# Patient Record
Sex: FEMALE | Race: WHITE | URBAN - METROPOLITAN AREA
[De-identification: names, ages, dates, MRNs, and addresses within clinical notes are randomized per-mention and may not be internally consistent; named-entity substitution may affect disease eponyms.]

---

## 2017-06-26 ENCOUNTER — TRANSFERRED RECORDS (OUTPATIENT)
Dept: HEALTH INFORMATION MANAGEMENT | Facility: CLINIC | Age: 67
End: 2017-06-26

## 2017-07-10 ENCOUNTER — TRANSFERRED RECORDS (OUTPATIENT)
Dept: HEALTH INFORMATION MANAGEMENT | Facility: CLINIC | Age: 67
End: 2017-07-10

## 2017-07-11 ENCOUNTER — MEDICAL CORRESPONDENCE (OUTPATIENT)
Dept: HEALTH INFORMATION MANAGEMENT | Facility: CLINIC | Age: 67
End: 2017-07-11

## 2017-07-11 ENCOUNTER — TRANSFERRED RECORDS (OUTPATIENT)
Dept: HEALTH INFORMATION MANAGEMENT | Facility: CLINIC | Age: 67
End: 2017-07-11

## 2017-07-12 ENCOUNTER — TRANSFERRED RECORDS (OUTPATIENT)
Dept: HEALTH INFORMATION MANAGEMENT | Facility: CLINIC | Age: 67
End: 2017-07-12

## 2017-07-13 ENCOUNTER — TRANSFERRED RECORDS (OUTPATIENT)
Dept: HEALTH INFORMATION MANAGEMENT | Facility: CLINIC | Age: 67
End: 2017-07-13

## 2017-08-04 ENCOUNTER — TRANSFERRED RECORDS (OUTPATIENT)
Dept: HEALTH INFORMATION MANAGEMENT | Facility: CLINIC | Age: 67
End: 2017-08-04

## 2017-08-08 ENCOUNTER — CARE COORDINATION (OUTPATIENT)
Dept: GASTROENTEROLOGY | Facility: CLINIC | Age: 67
End: 2017-08-08

## 2017-08-08 DIAGNOSIS — K86.2 PANCREATIC CYST: Primary | ICD-10-CM

## 2017-08-08 NOTE — PROGRESS NOTES
Advanced Endoscopy Clinic Intake:    Referring/Requesting provider and Health care System: Dr. Tika Barnes, [facility]     Clinic contact - Name, Phone and Fax number: Анна   Phone is [phone]   Fax is [phone]     Requested provider (if specified): Dr. Overton     Has patient been evaluated in clinic or had a procedure Advance Endoscopy provider in the last 5 years: No       Indication/Diagnosis for consultation: Pancreatic lesions noted on CT scan on 6/26/17     Is diagnosis on list of approved diagnosis: Yes   Has patient been evaluated by another Gastroenterologist? Yes, Dr. Barnes at [place]   Imaging completed:     CT scan     Yes, 6/26/17   MRI       No     Procedures:     Upper Endoscopy/EGD Yes, 7/11/17     Endoscopic Ultrasound/EUS            No     ERCP No     Colonoscopy Yes, 4/2/13     Are images able/being pushed to our system? Yes, per Анна from [facility]    Is patient aware of request for clinc consultation and ok to be contacted to schedule? Yes       Referral Received: 8/4/2017    Hard Copy chart created/ Records received: 8/8/2017 8/8/2017 0830: called Анна and asked for them to send the images for review.   8/8/2017 1201: no images in PACS.   8/9/2017: 1110: images in PACS    MD review date:       8/9/2017: routed to Dr. Overton                         Clinical History (per RN review of records provided):   Requesting EUS for evaluation of pancreatic lesion: possible IPMN.     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     7/11: she had EGD for abnormal CT findings.   CT: Multiple focal lesions of fluid attenuation are seen throughout the pancreas. The largest lesion involves the pancreatic neck and measures 1.9 cm x 1.7 in the greatest dimensions. Intraductal papillary mucinous neoplasm (IPMN) is suspected.     Recommendations/Orders:    Reviewed by Dr. Overton:  1. EUS for evaluation of pancreatic cyst in the OR with MAC.   2. Will  need Pre-op    Order placed and called left message for her to call back to go over the plan.  Sent to scheduling.   Hard copy chart sent to be scanned.            Hiral CARUSO RN Coordinator  Dr. Mccain, Dr. Overton & Dr. Raymond   Advanced Endoscopy  [phone]

## 2017-08-09 NOTE — PROGRESS NOTES
Case reviewed; direct to EUS for evaluation of pancreatic cyst; with me in the OR using MAC  No urgency as I dont think a mass was found; Ill review imaging prior  Dc Overton MD PhD  Director of Endoscopy   of Medicine, Surgery and Pediatrics  Interventional and Therapeutic Endoscopy    [facility]  Division of Gastroenterology and Hepatology  [facility] [address] [place], [place] [address]    New Consultations  [phone]  Procedure Scheduling [phone]  Clinical Nurse Coordinator [phone]  Clinical Fax   [phone]  Administrative   [phone]  Administrative Fax  [phone]

## 2017-08-10 ENCOUNTER — CARE COORDINATION (OUTPATIENT)
Dept: GASTROENTEROLOGY | Facility: CLINIC | Age: 67
End: 2017-08-10

## 2017-08-10 NOTE — PROGRESS NOTES
"Lary called back and she is amenable to plan for EUS. She is aware this is a non-urgent procedure. She will expect a call for date and time.   She is aware to she will need a , someone to stay with her for 24 hours and to stay in town for 24 hours post procedure, rational explained.   She is not on any blood thinner, no ASA and not diabetic. She will get her pre-op physical done locally and will fax a copy to us along with bringing a hard copy with her. Fax number given.   She is aware a pre-op nurse will call her 1-2 days prior to the procedure. She is advised to be NPO at midnight before the procedure in the event her procedure is moved up.     She is advised post procedure to start with clear liquids and advance diet slowly as tolerated. She is aware this RN will call her within 2-3 days post procedure to see how she is doing. In themean time post procedure she is   ~ Advised to go to the ER if develops:    Fevers over 101 +/- chills,    Significant nausea/vomiting causing inability to take in fluids depleting hydration.    severe pain, ongoing symptoms (pain, nausea) that cannot be controlled with prescribed or OTC medication.    Signs of dehydration (pale skin, low blood pressure, lightheadedness, dark urine, \"sticky\" skin when pinched, rapid heart rate, little to no urine output).    Verbalized understanding of all instructions. All questions answered.   Contact information verified for future questions/concerns.     Hiral CARUSO RN Coordinator  Dr. Mccain, Dr. Overton & Dr. Raymond   Advanced Endoscopy  [phone]      "

## 2017-08-11 NOTE — PROGRESS NOTES
All hard copy records sent to be scanned into [facility].     Hiral CARUSO RN Coordinator  Dr. Mccain, Dr. Overton & Dr. Raymond   Advanced Endoscopy  [phone]

## 2017-08-22 ENCOUNTER — TELEPHONE (OUTPATIENT)
Dept: GASTROENTEROLOGY | Facility: CLINIC | Age: 67
End: 2017-08-22

## 2017-09-13 DIAGNOSIS — R10.9 ABDOMINAL PAIN: Primary | ICD-10-CM

## 2017-09-14 ENCOUNTER — TELEPHONE (OUTPATIENT)
Dept: GASTROENTEROLOGY | Facility: CLINIC | Age: 67
End: 2017-09-14

## 2017-09-14 NOTE — TELEPHONE ENCOUNTER
Called Lary to inform her of the scheduled appointments.   Patient advised me that her doctor had an conference call with another doctor after her last CT and they told her to wait before scheduling anything at the [facility].   I will send a note back to Dr. Overton.     SR 09/14/2017  159p

## 2017-09-19 ENCOUNTER — TELEPHONE (OUTPATIENT)
Dept: GASTROENTEROLOGY | Facility: CLINIC | Age: 67
End: 2017-09-19

## 2017-09-19 NOTE — TELEPHONE ENCOUNTER
Lary is calling to keep her scheduled appointments with Dr. Overton.   Patient knows she is scheduled on 10/11/2017 at 9 AM for an MRCP then at 12:05 PM for an EUS.   Lary was instructed to head up to station 3C on the 3rd floor right after her MRCP to check in for her procedure at the [facility].  Lary will get an H&P done locally to clear her for anesthesia administration.   She knows to arrange for a  and someone to closely monitor her for at least 24 hours post procedure.  All instructions along with the H&P form will be mailed to pt at her address listed in EPIC, it was confirmed during this call to be current.    SR 09/19/2017  828a

## 2017-10-11 ENCOUNTER — SURGERY (OUTPATIENT)
Age: 67
End: 2017-10-11

## 2017-10-11 ENCOUNTER — HOSPITAL ENCOUNTER (OUTPATIENT)
Facility: CLINIC | Age: 67
Discharge: HOME | End: 2017-10-11
Attending: INTERNAL MEDICINE | Admitting: INTERNAL MEDICINE
Payer: MEDICARE

## 2017-10-11 ENCOUNTER — ANESTHESIA (OUTPATIENT)
Dept: SURGERY | Facility: CLINIC | Age: 67
End: 2017-10-11
Payer: MEDICARE

## 2017-10-11 ENCOUNTER — ANESTHESIA EVENT (OUTPATIENT)
Dept: SURGERY | Facility: CLINIC | Age: 67
End: 2017-10-11
Payer: MEDICARE

## 2017-10-11 ENCOUNTER — HOSPITAL ENCOUNTER (OUTPATIENT)
Dept: MRI IMAGING | Facility: CLINIC | Age: 67
End: 2017-10-11
Attending: INTERNAL MEDICINE
Payer: MEDICARE

## 2017-10-11 VITALS
SYSTOLIC BLOOD PRESSURE: 138 MMHG | WEIGHT: 149.25 LBS | BODY MASS INDEX: 24.87 KG/M2 | DIASTOLIC BLOOD PRESSURE: 95 MMHG | TEMPERATURE: 98 F | RESPIRATION RATE: 16 BRPM | HEIGHT: 65 IN | OXYGEN SATURATION: 99 %

## 2017-10-11 DIAGNOSIS — K86.2 PANCREATIC CYST: Primary | ICD-10-CM

## 2017-10-11 DIAGNOSIS — R10.9 ABDOMINAL PAIN: ICD-10-CM

## 2017-10-11 LAB
AMYLASE FLD-CCNC: NORMAL U/L
AMYLASE FLD-CCNC: NORMAL U/L
BUN SERPL-MCNC: 10 MG/DL (ref 7–30)
CEA FLD-MCNC: 26.3 UG/L
CEA FLD-MCNC: NORMAL NG/ML
CREAT SERPL-MCNC: 0.7 MG/DL (ref 0.52–1.04)
ERYTHROCYTE [DISTWIDTH] IN BLOOD BY AUTOMATED COUNT: 13.2 % (ref 10–15)
GFR SERPL CREATININE-BSD FRML MDRD: 83 ML/MIN/1.7M2
GLUCOSE BLDC GLUCOMTR-MCNC: 83 MG/DL (ref 70–99)
HCT VFR BLD AUTO: 42.1 % (ref 35–47)
HGB BLD-MCNC: 13.9 G/DL (ref 11.7–15.7)
INR PPP: 0.92 (ref 0.86–1.14)
MCH RBC QN AUTO: 32.1 PG (ref 26.5–33)
MCHC RBC AUTO-ENTMCNC: 33 G/DL (ref 31.5–36.5)
MCV RBC AUTO: 97 FL (ref 78–100)
PLATELET # BLD AUTO: 240 10E9/L (ref 150–450)
POTASSIUM SERPL-SCNC: 3.9 MMOL/L (ref 3.4–5.3)
RBC # BLD AUTO: 4.33 10E12/L (ref 3.8–5.2)
SPECIMEN SOURCE FLD: NORMAL
UPPER EUS: NORMAL
WBC # BLD AUTO: 5.1 10E9/L (ref 4–11)

## 2017-10-11 PROCEDURE — 40000065 ZZH STATISTIC EKG NON-CHARGEABLE

## 2017-10-11 PROCEDURE — 82962 GLUCOSE BLOOD TEST: CPT

## 2017-10-11 PROCEDURE — 74183 MRI ABD W/O CNTR FLWD CNTR: CPT

## 2017-10-11 PROCEDURE — 25000128 H RX IP 250 OP 636: Performed by: ANESTHESIOLOGY

## 2017-10-11 PROCEDURE — 84520 ASSAY OF UREA NITROGEN: CPT | Performed by: INTERNAL MEDICINE

## 2017-10-11 PROCEDURE — 82565 ASSAY OF CREATININE: CPT | Performed by: INTERNAL MEDICINE

## 2017-10-11 PROCEDURE — 82150 ASSAY OF AMYLASE: CPT | Performed by: INTERNAL MEDICINE

## 2017-10-11 PROCEDURE — 84132 ASSAY OF SERUM POTASSIUM: CPT | Performed by: INTERNAL MEDICINE

## 2017-10-11 PROCEDURE — 37000009 ZZH ANESTHESIA TECHNICAL FEE, EACH ADDTL 15 MIN: Performed by: INTERNAL MEDICINE

## 2017-10-11 PROCEDURE — 25000128 H RX IP 250 OP 636: Performed by: INTERNAL MEDICINE

## 2017-10-11 PROCEDURE — 88108 CYTOPATH CONCENTRATE TECH: CPT | Performed by: INTERNAL MEDICINE

## 2017-10-11 PROCEDURE — 88313 SPECIAL STAINS GROUP 2: CPT | Performed by: INTERNAL MEDICINE

## 2017-10-11 PROCEDURE — 27210794 ZZH OR GENERAL SUPPLY STERILE: Performed by: INTERNAL MEDICINE

## 2017-10-11 PROCEDURE — 40000170 ZZH STATISTIC PRE-PROCEDURE ASSESSMENT II: Performed by: INTERNAL MEDICINE

## 2017-10-11 PROCEDURE — 36000053 ZZH SURGERY LEVEL 2 EA 15 ADDTL MIN - UMMC: Performed by: INTERNAL MEDICINE

## 2017-10-11 PROCEDURE — 93005 ELECTROCARDIOGRAM TRACING: CPT

## 2017-10-11 PROCEDURE — 85027 COMPLETE CBC AUTOMATED: CPT | Performed by: INTERNAL MEDICINE

## 2017-10-11 PROCEDURE — A9270 NON-COVERED ITEM OR SERVICE: HCPCS | Performed by: INTERNAL MEDICINE

## 2017-10-11 PROCEDURE — 25000125 ZZHC RX 250: Performed by: INTERNAL MEDICINE

## 2017-10-11 PROCEDURE — 36000051 ZZH SURGERY LEVEL 2 1ST 30 MIN - UMMC: Performed by: INTERNAL MEDICINE

## 2017-10-11 PROCEDURE — A9585 GADOBUTROL INJECTION: HCPCS | Performed by: INTERNAL MEDICINE

## 2017-10-11 PROCEDURE — 71000027 ZZH RECOVERY PHASE 2 EACH 15 MINS: Performed by: INTERNAL MEDICINE

## 2017-10-11 PROCEDURE — 36000059 ZZH SURGERY LEVEL 3 EA 15 ADDTL MIN UMMC: Performed by: INTERNAL MEDICINE

## 2017-10-11 PROCEDURE — 93010 ELECTROCARDIOGRAM REPORT: CPT | Performed by: INTERNAL MEDICINE

## 2017-10-11 PROCEDURE — 36415 COLL VENOUS BLD VENIPUNCTURE: CPT | Performed by: INTERNAL MEDICINE

## 2017-10-11 PROCEDURE — 25000128 H RX IP 250 OP 636: Performed by: NURSE ANESTHETIST, CERTIFIED REGISTERED

## 2017-10-11 PROCEDURE — 37000008 ZZH ANESTHESIA TECHNICAL FEE, 1ST 30 MIN: Performed by: INTERNAL MEDICINE

## 2017-10-11 PROCEDURE — 82378 CARCINOEMBRYONIC ANTIGEN: CPT | Performed by: INTERNAL MEDICINE

## 2017-10-11 PROCEDURE — 85610 PROTHROMBIN TIME: CPT | Performed by: INTERNAL MEDICINE

## 2017-10-11 PROCEDURE — 36000057 ZZH SURGERY LEVEL 3 1ST 30 MIN - UMMC: Performed by: INTERNAL MEDICINE

## 2017-10-11 PROCEDURE — 25000125 ZZHC RX 250: Performed by: NURSE ANESTHETIST, CERTIFIED REGISTERED

## 2017-10-11 RX ORDER — SODIUM CHLORIDE, SODIUM LACTATE, POTASSIUM CHLORIDE, CALCIUM CHLORIDE 600; 310; 30; 20 MG/100ML; MG/100ML; MG/100ML; MG/100ML
INJECTION, SOLUTION INTRAVENOUS CONTINUOUS
Status: DISCONTINUED | OUTPATIENT
Start: 2017-10-11 | End: 2017-10-11 | Stop reason: HOSPADM

## 2017-10-11 RX ORDER — ONDANSETRON 4 MG/1
4 TABLET, ORALLY DISINTEGRATING ORAL EVERY 30 MIN PRN
Status: DISCONTINUED | OUTPATIENT
Start: 2017-10-11 | End: 2017-10-11 | Stop reason: HOSPADM

## 2017-10-11 RX ORDER — LIDOCAINE HYDROCHLORIDE 20 MG/ML
INJECTION, SOLUTION INFILTRATION; PERINEURAL PRN
Status: DISCONTINUED | OUTPATIENT
Start: 2017-10-11 | End: 2017-10-11

## 2017-10-11 RX ORDER — CIPROFLOXACIN 2 MG/ML
INJECTION, SOLUTION INTRAVENOUS PRN
Status: DISCONTINUED | OUTPATIENT
Start: 2017-10-11 | End: 2017-10-11

## 2017-10-11 RX ORDER — FLUMAZENIL 0.1 MG/ML
0.2 INJECTION, SOLUTION INTRAVENOUS
Status: DISCONTINUED | OUTPATIENT
Start: 2017-10-11 | End: 2017-10-11 | Stop reason: HOSPADM

## 2017-10-11 RX ORDER — PROPOFOL 10 MG/ML
INJECTION, EMULSION INTRAVENOUS PRN
Status: DISCONTINUED | OUTPATIENT
Start: 2017-10-11 | End: 2017-10-11

## 2017-10-11 RX ORDER — PROPOFOL 10 MG/ML
INJECTION, EMULSION INTRAVENOUS CONTINUOUS PRN
Status: DISCONTINUED | OUTPATIENT
Start: 2017-10-11 | End: 2017-10-11

## 2017-10-11 RX ORDER — LABETALOL HYDROCHLORIDE 5 MG/ML
10 INJECTION, SOLUTION INTRAVENOUS
Status: DISCONTINUED | OUTPATIENT
Start: 2017-10-11 | End: 2017-10-11 | Stop reason: HOSPADM

## 2017-10-11 RX ORDER — HYDROMORPHONE HYDROCHLORIDE 1 MG/ML
.3-.5 INJECTION, SOLUTION INTRAMUSCULAR; INTRAVENOUS; SUBCUTANEOUS EVERY 5 MIN PRN
Status: DISCONTINUED | OUTPATIENT
Start: 2017-10-11 | End: 2017-10-11 | Stop reason: HOSPADM

## 2017-10-11 RX ORDER — GADOBUTROL 604.72 MG/ML
7.5 INJECTION INTRAVENOUS ONCE
Status: COMPLETED | OUTPATIENT
Start: 2017-10-11 | End: 2017-10-11

## 2017-10-11 RX ORDER — ONDANSETRON 2 MG/ML
4 INJECTION INTRAMUSCULAR; INTRAVENOUS EVERY 30 MIN PRN
Status: DISCONTINUED | OUTPATIENT
Start: 2017-10-11 | End: 2017-10-11 | Stop reason: HOSPADM

## 2017-10-11 RX ORDER — CIPROFLOXACIN 500 MG/1
500 TABLET, FILM COATED ORAL 2 TIMES DAILY
Qty: 6 TABLET | Refills: 0 | Status: SHIPPED | OUTPATIENT
Start: 2017-10-11 | End: 2017-10-14

## 2017-10-11 RX ORDER — LIDOCAINE 40 MG/G
CREAM TOPICAL
Status: DISCONTINUED | OUTPATIENT
Start: 2017-10-11 | End: 2017-10-11 | Stop reason: HOSPADM

## 2017-10-11 RX ORDER — FENTANYL CITRATE 50 UG/ML
25-50 INJECTION, SOLUTION INTRAMUSCULAR; INTRAVENOUS
Status: DISCONTINUED | OUTPATIENT
Start: 2017-10-11 | End: 2017-10-11 | Stop reason: HOSPADM

## 2017-10-11 RX ORDER — NALOXONE HYDROCHLORIDE 0.4 MG/ML
.1-.4 INJECTION, SOLUTION INTRAMUSCULAR; INTRAVENOUS; SUBCUTANEOUS
Status: DISCONTINUED | OUTPATIENT
Start: 2017-10-11 | End: 2017-10-11 | Stop reason: HOSPADM

## 2017-10-11 RX ADMIN — GADOBUTROL 7.5 ML: 604.72 INJECTION INTRAVENOUS at 09:13

## 2017-10-11 RX ADMIN — PROPOFOL 50 MG: 10 INJECTION, EMULSION INTRAVENOUS at 10:42

## 2017-10-11 RX ADMIN — LIDOCAINE HYDROCHLORIDE 5 ML: 20 SOLUTION ORAL; TOPICAL at 10:28

## 2017-10-11 RX ADMIN — MIDAZOLAM HYDROCHLORIDE 1 MG: 1 INJECTION, SOLUTION INTRAMUSCULAR; INTRAVENOUS at 10:28

## 2017-10-11 RX ADMIN — LIDOCAINE HYDROCHLORIDE 60 MG: 20 INJECTION, SOLUTION INFILTRATION; PERINEURAL at 10:42

## 2017-10-11 RX ADMIN — CIPROFLOXACIN 400 MG: 2 INJECTION INTRAVENOUS at 10:59

## 2017-10-11 RX ADMIN — SIMETHICONE 2 ML: 63.3; 3.7 SOLUTION/ DROPS ORAL at 10:46

## 2017-10-11 RX ADMIN — PROPOFOL 150 MCG/KG/MIN: 10 INJECTION, EMULSION INTRAVENOUS at 10:31

## 2017-10-11 RX ADMIN — SODIUM CHLORIDE, POTASSIUM CHLORIDE, SODIUM LACTATE AND CALCIUM CHLORIDE: 600; 310; 30; 20 INJECTION, SOLUTION INTRAVENOUS at 10:28

## 2017-10-11 NOTE — DISCHARGE INSTRUCTIONS
[facility]  Same-Day Surgery   Adult Discharge Orders & Instructions     For 24 hours after surgery    1. Get plenty of rest.  A responsible adult must stay with you for at least 24 hours after you leave the hospital.   2. Do not drive or use heavy equipment.  If you have weakness or tingling, don't drive or use heavy equipment until this feeling goes away.  3. Do not drink alcohol.  4. Avoid strenuous or risky activities.  Ask for help when climbing stairs.   5. You may feel lightheaded.  IF so, sit for a few minutes before standing.  Have someone help you get up.   6. If you have nausea (feel sick to your stomach): Drink only clear liquids such as apple juice, ginger ale, broth or 7-Up.  Rest may also help.  Be sure to drink enough fluids.  Move to a regular diet as you feel able.  7. You may have a slight fever. Call the doctor if your fever is over 100 F (37.7 C) (taken under the tongue) or lasts longer than 24 hours.  8. You may have a dry mouth, a sore throat, muscle aches or trouble sleeping.  These should go away after 24 hours.  9. Do not make important or legal decisions.   Call your doctor for any of the followin.  Signs of infection (fever, growing tenderness at the surgery site, a large amount of drainage or bleeding, severe pain, foul-smelling drainage, redness, swelling).    2. It has been over 8 to 10 hours since surgery and you are still not able to urinate (pass water).    3.  Headache for over 24 hours.    To contact a doctor, call Dr Overton's office at [phone] (clinic)    or:        [phone] and ask for the resident on call for GI (answered 24 hours a day)      Emergency Department:    [facility]: [phone]       (TTY for hearing impaired: [phone])

## 2017-10-11 NOTE — ANESTHESIA PREPROCEDURE EVALUATION
Anesthesia Evaluation     . Pt has had prior anesthetic.     No history of anesthetic complications          ROS/MED HX    ENT/Pulmonary:  - neg pulmonary ROS     Neurologic:  - neg neurologic ROS     Cardiovascular:     (+) hypertension----. : . . . :. .       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      GI/Hepatic:     (+) liver disease, Other GI/Hepatic hepatomegaly; r/o pancreatic lesion      Renal/Genitourinary:  - ROS Renal section negative       Endo:  - neg endo ROS       Psychiatric:  - neg psychiatric ROS       Infectious Disease:  - neg infectious disease ROS       Malignancy:      - no malignancy   Other:    - neg other ROS                 Physical Exam  Normal systems: cardiovascular and pulmonary    Airway   Mallampati: II  TM distance: >3 FB  Neck ROM: full    Dental   (+) partials    Cardiovascular       Pulmonary                     Anesthesia Plan      History & Physical Review  History and physical reviewed and following examination; no interval change.    ASA Status:  3 .    NPO Status:  > 8 hours    Plan for General and ETT with Intravenous induction. Maintenance will be Balanced.    PONV prophylaxis:  Ondansetron (or other 5HT-3)  Additional equipment: 2nd IV      Postoperative Care  Postoperative pain management:  IV analgesics.      Consents  Anesthetic plan, risks, benefits and alternatives discussed with:  Patient..                          .

## 2017-10-11 NOTE — IP AVS SNAPSHOT
MRN:[id number]                      After Visit Summary   10/11/2017    Lary Garner    MRN: [id number]           Thank you!     Thank you for choosing [facility] for your care. Our goal is always to provide you with excellent care. Hearing back from our patients is one way we can continue to improve our services. Please take a few minutes to complete the written survey that you may receive in the mail after you visit with us. Thank you!        Patient Information     Date Of Birth          1950        About your hospital stay     You were admitted on:  October 11, 2017 You last received care in the:  Same Day Surgery [facility]    You were discharged on:  October 11, 2017       Who to Call     For medical emergencies, please call 911.  For non-urgent questions about your medical care, please call your primary care provider or clinic, [phone]  For questions related to your surgery, please call your surgery clinic        Attending Provider     Provider Specialty    Dc Overton MD Gastroenterology       Primary Care Provider Office Phone # Fax #    Ciaran Rios [phone]      After Care Instructions     Discharge Instructions       No driving or operating machinery until the day after procedure.            Discharge Instructions       Recommend that a responsible adult remain with the patient at home for 24 hours post discharge.            Discharge Instructions       Start with clear liquids, sips of water 1 hour after procedure. If no abdominal pain and gag reflex has returned, advance as tolerated to pre-procedure diet.              Discharge Instructions       Restart home medications.            Discharge Instructions       Check with your Provider when to start anticoagulant medication.            Discharge Instructions       No ALCOHOL 24 hours post procedure.                  Your next 10 appointments already scheduled     Oct 11, 2017   Procedure with  Dc Overton MD   [facility], [place], Same Day Surgery (--)    [address]  [place] [address] [phone]              Further instructions from your care team       [facility], [place]  Same-Day Surgery   Adult Discharge Orders & Instructions     For 24 hours after surgery    1. Get plenty of rest.  A responsible adult must stay with you for at least 24 hours after you leave the hospital.   2. Do not drive or use heavy equipment.  If you have weakness or tingling, don't drive or use heavy equipment until this feeling goes away.  3. Do not drink alcohol.  4. Avoid strenuous or risky activities.  Ask for help when climbing stairs.   5. You may feel lightheaded.  IF so, sit for a few minutes before standing.  Have someone help you get up.   6. If you have nausea (feel sick to your stomach): Drink only clear liquids such as apple juice, ginger ale, broth or 7-Up.  Rest may also help.  Be sure to drink enough fluids.  Move to a regular diet as you feel able.  7. You may have a slight fever. Call the doctor if your fever is over 100 F (37.7 C) (taken under the tongue) or lasts longer than 24 hours.  8. You may have a dry mouth, a sore throat, muscle aches or trouble sleeping.  These should go away after 24 hours.  9. Do not make important or legal decisions.   Call your doctor for any of the followin.  Signs of infection (fever, growing tenderness at the surgery site, a large amount of drainage or bleeding, severe pain, foul-smelling drainage, redness, swelling).    2. It has been over 8 to 10 hours since surgery and you are still not able to urinate (pass water).    3.  Headache for over 24 hours.    To contact a doctor, call Dr Overton's office at [phone] (clinic)    or:        [phone] and ask for the resident on call for GI (answered 24 hours a day)      Emergency Department:    [facility]: [phone]       (TTY for hearing impaired:  "[phone])          Pending Results     Date and Time Order Name Status Description    10/11/2017 1057 Cytology non gyn In process     10/11/2017 1057 Pancreas cyst panel In process     10/11/2017 1057 CEA In process     10/11/2017 1057 Amylase  fluid In process     10/11/2017 0942 EKG 12-lead, tracing only Preliminary             Admission Information     Date & Time Provider Department Dept. Phone    10/11/2017 Dc Overton MD Same Day Surgery [facility] [phone]      Your Vitals Were     Blood Pressure Temperature Respirations Height Weight Pulse Oximetry    138/95 98  F (36.7  C) (Oral) 16 1.651 m (5' 5\") 67.7 kg (149 lb 4 oz) 99%    BMI (Body Mass Index)                   24.84 kg/m2           [employer] Information     [employer] lets you send messages to your doctor, view your test results, renew your prescriptions, schedule appointments and more. To sign up, go to [email or web address] . Click on \"Log in\" on the left side of the screen, which will take you to the Welcome page. Then click on \"Sign up Now\" on the right side of the page.     You will be asked to enter the access code listed below, as well as some personal information. Please follow the directions to create your username and password.     Your access code is: [email or web address]  Expires: 2018 11:24 AM     Your access code will  in 90 days. If you need help or a new code, please call your [facility] clinic or [phone].        Care EveryWhere ID     This is your Care EveryWhere ID. This could be used by other organizations to access your [facility] medical records  [id number]        Equal Access to Services     WILBER MEJIAS : Hadanjelica Morton, waanh rawls, gianluca kaalmada ozzy, valerie velasquez. So [facility] [phone].    ATENCIÓN: Si habla español, tiene a fragoso disposición servicios gratuitos de asistencia lingüística. Llame al [phone].    We comply with applicable federal " civil rights laws and [place] laws. We do not discriminate on the basis of race, color, national origin, age, disability, sex, sexual orientation, or gender identity.               Review of your medicines      START taking        Dose / Directions    ciprofloxacin 500 MG tablet   Commonly known as:  CIPRO   Used for:  Pancreatic cyst        Dose:  500 mg   Take 1 tablet (500 mg) by mouth 2 times daily for 3 days   Quantity:  6 tablet   Refills:  0         CONTINUE these medicines which have NOT CHANGED        Dose / Directions    LISINOPRIL PO        Take by mouth daily Patient not sure of the dosage takes 1 1/2 tablets daily   Refills:  0            Where to get your medicines      These medications were sent to [facility]/pharm[facility] #[id number] - [place] - [address]   [address] [place] [address]     Phone:  [phone]     ciprofloxacin 500 MG tablet               ANTIBIOTIC INSTRUCTION     You've Been Prescribed an Antibiotic - Now What?  Your healthcare team thinks that you or your loved one might have an infection. Some infections can be treated with antibiotics, which are powerful, life-saving drugs. Like all medications, antibiotics have side effects and should only be used when necessary. There are some important things you should know about your antibiotic treatment.      Your healthcare team may run tests before you start taking an antibiotic.    Your team may take samples (e.g., from your blood, urine or other areas) to run tests to look for bacteria. These test can be important to determine if you need an antibiotic at all and, if you do, which antibiotic will work best.      Within a few days, your healthcare team might change or even stop your antibiotic.    Your team may start you on an antibiotic while they are working to find out what is making you sick.    Your team might change your antibiotic because test results show that a different antibiotic would be better to treat your infection.    In  some cases, once your team has more information, they learn that you do not need an antibiotic at all. They may find out that you don't have an infection, or that the antibiotic you're taking won't work against your infection. For example, an infection caused by a virus can't be treated with antibiotics. Staying on an antibiotic when you don't need it is more likely to be harmful than helpful.      You may experience side effects from your antibiotic.    Like all medications, antibiotics have side effects. Some of these can be serious.    Let you healthcare team know if you have any known allergies when you are admitted to the hospital.    One significant side effect of nearly all antibiotics is the risk of severe and sometimes deadly diarrhea caused by Clostridium difficile (C. Difficile). This occurs when a person takes antibiotics because some good germs are destroyed. Antibiotic use allows C. diificile to take over, putting patients at high risk for this serious infection.    As a patient or caregiver, it is important to understand your or your loved one's antibiotic treatment. It is especially important for caregivers to speak up when patients can't speak for themselves. Here are some important questions to ask your healthcare team.    What infection is this antibiotic treating and how do you know I have that infection?    What side effects might occur from this antibiotic?    How long will I need to take this antibiotic?    Is it safe to take this antibiotic with other medications or supplements (e.g., vitamins) that I am taking?     Are there any special directions I need to know about taking this antibiotic? For example, should I take it with food?    How will I be monitored to know whether my infection is responding to the antibiotic?    What tests may help to make sure the right antibiotic is prescribed for me?      Information provided by:  www.cdc.gov/getsmart  U.S. Department of Health and Human  Services  Centers for disease Control and Prevention  National Center for Emerging and Zoonotic Infectious Diseases  Division of Healthcare Quality Promotion         Protect others around you: Learn how to safely use, store and throw away your medicines at www.disposemymeds.org.             Medication List: This is a list of all your medications and when to take them. Check marks below indicate your daily home schedule. Keep this list as a reference.      Medications           Morning Afternoon Evening Bedtime As Needed    ciprofloxacin 500 MG tablet   Commonly known as:  CIPRO   Take 1 tablet (500 mg) by mouth 2 times daily for 3 days                                LISINOPRIL PO   Take by mouth daily Patient not sure of the dosage takes 1 1/2 tablets daily

## 2017-10-11 NOTE — LETTER
Patient:  Lary Garner  :   1950  MRN:     [id number]        Ms.Karol ARLENE Garner  [address]   [facility] [address]        2017    Dear ,    We are writing to inform you of your test results. In short, more good news. The cytologic evaluation of the fluid was unremarkable and without concerning feature. Moreover, as with the laboratory studies, the cyst does not appear to be premalignant.    I have included the formal documtentation of the results below. It continues to be a pleasure participating in your care.  Please feel free to contact our clinic with any further questions.      Sincerely,    Dc Overton MD PhD  Director of Endoscopy   of Medicine, Surgery and Pediatrics  Interventional and Therapeutic Endoscopy    [facility]  Division of Gastroenterology and Hepatology  [facility] [address] [id number]    New Consultations  [phone]  Procedure Scheduling [phone]  Clinical Nurse Coordinator [phone]  Clinical Fax   [phone]  Administrative   [phone]  Administrative Fax  [phone]    Resulted Orders   Amylase  fluid   Result Value Ref Range    Amylase Fluid Source Canceled, Test credited       Comment:      Duplicate request  CORRECTED ON 10/11 AT 1230: PREVIOUSLY REPORTED AS Pancreatic fluid      Amylase Fluid Canceled, Test credited U/L      Comment:      Duplicate request   Pancreas cyst panel   Result Value Ref Range    CEA Fluid 26.3 ug/L      Comment:      Performance characteristics for this specimen type are unknown.  Reference   ranges have not been established.      Cytology Test Sent to Cytology Lab     Amylase Fluid >[id number] U/L      Comment:      No reference ranges have been established.  This result should be interpreted   in the context of the patient's clinical condition and compared to   simultaneous measurement in the patient's blood.  Refer to Lab Guide for   specific  interpretive guidelines.     Cytology non gyn   Result Value Ref Range    Copath Report       Patient Name: INNA PEREZ  MR#: [id number]  Specimen #: [id number]  Collected: 10/11/2017  Received: 10/11/2017  Reported: 10/12/2017 14:38  Ordering Phy(s): TAD ZUNIGA    For improved result formatting, select 'View Enhanced Report Format'  under Linked Documents section.    SPECIMEN/STAIN PROCESS:  Pancreatic cyst fluid       Pap-Cyto x 1, Jrxgeaybrxs-E-QQM x 1, Mucicarmine Control, F-[facility] x 1    ----------------------------------------------------------------    CYTOLOGIC INTERPRETATION:     Pancreatic cyst fluid:   Negative for malignancy  Mucicarmine stain is negative for intracellular or extracellular mucin.  Specimen Adequacy: Satisfactory for evaluation.    Electronically signed out by:    Davon Rogers M.D., [facility]    Processed and screened at [facility]    CLINICAL HISTORY:  The patient is a 67 year old woman with pancreatic cysts noted on  imaging study.  Pancreatic cyst analysis: Amylase ->66137,  CEA-2    ,    GROSS:  Pancreatic cyst fluid:  Received 0.6 ml of pink, hazy fluid, processed 1  Pap stained cytospin and 1 fixed cytospin for mucicarmine.    MICROSCOPIC:  A microscopic examination is performed.    Walt Rogers MD    CPT Codes:  A: 22906-GIO, 54873-QFWI    TESTING LAB LOCATION:  [facility], [address]   [address] [phone]    COLLECTION SITE:  Client:  [facility]  Location:  NORBERTO RATLIFF)

## 2017-10-11 NOTE — ANESTHESIA POSTPROCEDURE EVALUATION
Patient: Lary Garner    Procedure(s):  Endoscopic Ultrasound with fine needle aspiration - Wound Class: II-Clean Contaminated    Diagnosis:Abdominal Pain   Diagnosis Additional Information: No value filed.    Anesthesia Type:  ETT, MAC    Note:  Anesthesia Post Evaluation    Patient location during evaluation: PACU  Patient participation: Able to fully participate in evaluation  Level of consciousness: awake  Pain management: satisfactory to patient  Airway patency: patent  Cardiovascular status: acceptable  Respiratory status: acceptable  Hydration status: acceptable  PONV: none     Anesthetic complications: None          Last vitals:  Vitals:    10/11/17 0955 10/11/17 1110 10/11/17 1115   BP: (!) 140/98 116/75 116/75   Resp:  16 16   Temp:  36.6  C (97.9  F)    SpO2:  100% 100%         Electronically Signed By: Ciaran Reed MD  October 11, 2017  11:22 AM

## 2017-10-11 NOTE — IP AVS SNAPSHOT
Same Day Surgery [address] [address]    Phone:  [phone]                                       After Visit Summary   10/11/2017    Lary Garner    MRN: [id number]           After Visit Summary Signature Page     I have received my discharge instructions, and my questions have been answered. I have discussed any challenges I see with this plan with the nurse or doctor.    ..........................................................................................................................................  Patient/Patient Representative Signature      ..........................................................................................................................................  Patient Representative Print Name and Relationship to Patient    ..................................................               ................................................  Date                                            Time    ..........................................................................................................................................  Reviewed by Signature/Title    ...................................................              ..............................................  Date                                                            Time

## 2017-10-11 NOTE — ANESTHESIA CARE TRANSFER NOTE
Patient: Lary Garner    Procedure(s):  Endoscopic Ultrasound with fine needle aspiration - Wound Class: II-Clean Contaminated    Diagnosis: Abdominal Pain   Diagnosis Additional Information: No value filed.    Anesthesia Type:   ETT, MAC     Note:  Airway :Room Air  Patient transferred to:Phase II  Handoff Report: Identifed the Patient, Identified the Reponsible Provider, Reviewed the pertinent medical history, Discussed the surgical course, Reviewed Intra-OP anesthesia mangement and issues during anesthesia, Set expectations for post-procedure period and Allowed opportunity for questions and acknowledgement of understanding      Vitals: (Last set prior to Anesthesia Care Transfer)    CRNA VITALS  10/11/2017 1035 - 10/11/2017 1111      10/11/2017             Pulse: 71    SpO2: 99 %    Resp Rate (observed): (!)  1                Electronically Signed By: VALENTINE Tapia CRNA  October 11, 2017  11:11 AM

## 2017-10-11 NOTE — LETTER
Patient:  Lary Garner  :   1950  MRN:     [id number]        Ms.Karol ARLENE Garner  [address]   [facility] [address]        2017    Dear ,    We are writing to inform you of your test results. In short, the laboratory analysis of the fluid collected from your pancreatic cyst returned consistent with a completely benign lesion, that of a so called pseudocyst, and not an IPMN. There arise after events of acute pancreatitis and have no malignant potential. 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     I have included the formal documtentation of the results below. It continues to be a pleasure participating in your care.  Please feel free to contact our clinic with any further questions.      Sincerely,    Dc Overton MD PhD  Director of Endoscopy   of Medicine, Surgery and Pediatrics  Interventional and Therapeutic Endoscopy    [facility]  Division of Gastroenterology and Hepatology  [facility] [address] [address]    New Consultations  [phone]  Procedure Scheduling [phone]  Clinical Nurse Coordinator [phone]  Clinical Fax   [phone]  Administrative   [phone]  Administrative Fax  [phone]    Resulted Orders   Amylase  fluid   Result Value Ref Range    Amylase Fluid Source Canceled, Test credited       Comment:      Duplicate request  CORRECTED ON 10/11 AT 1230: PREVIOUSLY REPORTED AS Pancreatic fluid      Amylase Fluid Canceled, Test credited U/L      Comment:      Duplicate request   Pancreas cyst panel   Result Value Ref Range    CEA Fluid 26.3 ug/L      Comment:      Performance characteristics for this specimen type are unknown.  Reference   ranges have not been established.      Cytology Test Sent to Cytology Lab     Amylase Fluid >[id number] U/L       Comment:      No reference ranges have been established.  This result should be interpreted   in the context of the patient's clinical condition and compared to   simultaneous measurement in the patient's blood.  Refer to Lab Guide for   specific interpretive guidelines.

## 2017-10-11 NOTE — BRIEF OP NOTE
Upper EUS 10/11/2017 10:06 AM [facility], [address] [place] [address] [phone]     Endoscopy Department   _______________________________________________________________________________   Patient Name: Lary Garner              Procedure Date: 10/11/2017 10:06 AM   MRN: [id number]                       Account Number: [id number]   YOB: 1950              Admit Type: Outpatient   Age: 67                               Room: OR   Gender: Female                        Note Status: Finalized   Attending MD: Dc Overton MD   Total Sedation Time:   _______________________________________________________________________________       Procedure:           Upper EUS   Indications:         Pancreatic cyst on CT scan   Providers:           Dc Overton MD, Shailesh North MD, Delfina Hickey RN   Patient Profile:     Ms Garner is a delightful 66yo woman with imaging                        demonstrating several pancreatic cysts, one of which                        approaching 2cm in size, who now proceeds to evaluation                        by EUS. MRCP was performed in tandem demonstrating these                        cysts without concerning feature.   Referring MD:        Tika Barnes   Medicines:           Deep sedation was administered   Complications:       No immediate complications.   _______________________________________________________________________________   Procedure:           Pre-Anesthesia Assessment:                        - Prior to the procedure, a History and Physical was                        performed, and patient medications and allergies were                        reviewed. The patient is competent. The risks and                        benefits of the procedure and the sedation options and                        risks were discussed with the patient. All questions                        were answered and  informed consent was obtained. Patient                        identification and proposed procedure were verified by                        the nurse in the pre-procedure area. Mental Status                        Examination: alert and oriented. Airway Examination:                        normal oropharyngeal airway and neck mobility.                        Respiratory Examination: clear to auscultation. CV                        Examination: normal. ASA Grade Assessment: I - A normal,                        healthy patient. After reviewing the risks and benefits,                        the patient was deemed in satisfactory condition to                        undergo the procedure. The anesthesia plan was to use                        deep sedation / analgesia. Immediately prior to                        administration of medications, the patient was                        re-assessed for adequacy to receive sedatives. The heart                        rate, respiratory rate, oxygen saturations, blood                        pressure, adequacy of pulmonary ventilation, and                        response to care were monitored throughout the                        procedure. The physical status of the patient was                        re-assessed after the procedure. After obtaining                        informed consent, the endoscope was passed under direct                        vision. Throughout the procedure, the patient's blood                        pressure, pulse, and oxygen saturations were monitored                        continuously. The echoendoscope was introduced through                        the mouth, and advanced to the second part of duodenum.                        The upper EUS was accomplished without difficulty. The                        patient tolerated the procedure well.                                                                                     Findings:        White light  and endosonographic findings :        A curved linear echoendoscope was utilized throughout. Limited white        light views of the foregut were unremarkable including tangential views        of the ampulla. In terms of endosonography there were numerous, round,        simple anechoic lesions within head, genu, neck and tail, most of which        were less than 6mm in size. One found in the downstream body was        slightly larger than 10mm and another, the largest was found in the genu        measuring 16.3 mm by 15.4 mm in maximal cross-sectional diameter. None        of these lesions demonstrated concerning findings such as thick wall,        mural nodule, solid lesion or main duct involvement, though the largest        clearly communicating through a side branch. The remainder of the        pancreatic parenchyma was diffusely homogeneous without other lesion or        lobularity and the main duct was traced from the tail to the ampulla and        found without irregularity, dilation (measuring less than 3mm, 2mm, 1mm        head to tail) or internal solid component. The gallbladder is in situ        without wall thickening or internal solid component. The common duct was        traced from bifurcation to ampulla and was found decompressed, without        wall thickening or internal solid component. There was no finding of        lymphadenopathy in the periportal, peripancreatic, perigastric or celiac        regions. The major vasculature of the abdomen was grossly unremarkable        including the splenics, portal, superior mesenterics and celiac. Views        of the liver, left adrenal, left kidney and spleen were without        suspicious lesion. Diagnostic needle aspiration for fluid was performed        of the largest cystic lesion. Color Doppler imaging was utilized prior        to needle puncture to confirm a lack of significant vascular structures        within the needle path. One pass was made with  the 19 gauge needle using        a transgastric approach. No stylet was used. The amount of fluid        collected was 2 mL. The fluid was clear and thin. Sample(s) were sent        for amylase concentration, cytology and CEA.                                                                                     Impression:          - Mutiple simple cystic lesions were demonstrated                        throughout the pancreas, most measuring less than 6mm                        and the largest just over 16mm. This larger lesion was                        sampled for chemistry and cytology, however, no                        concerning features were demonstrated. The multiplicity                        and features of the cystic lesions suggest side branches                        intraductal muscinous neoplasms with studies for                        support. These will require surveillance, with the next                        study in the form of an MRI in one year. If the lesion                        has novel feature or rapid growth, repeat EUS will be                        recommended, however without these interval imaging in                        2-3 years will be recommended.   Recommendation:      - Standard deep sedation recovery with probable                        discharge home this morning                        - Follow up with Dr Barnes as scheduled                        - See impression above for surveillance recommendations                        - Complete a short course of antibiotic as prescribed                        - Avoid antithromobotics for at least three days                        - The findings and recommendations were discussed with                        the patient and their family                                                                                       electronically signed by FREDDIE Overton

## 2017-10-12 LAB
COPATH REPORT: NORMAL
INTERPRETATION ECG - MUSE: NORMAL

## 2017-10-13 ENCOUNTER — CARE COORDINATION (OUTPATIENT)
Dept: GASTROENTEROLOGY | Facility: CLINIC | Age: 67
End: 2017-10-13

## 2017-10-13 NOTE — PROGRESS NOTES
Post EUS  (10/13/2017) with Dr. Overton: Follow-up    Post procedure recommendations: Mutiple simple cystic lesions were demonstrated throughout the pancreas, most measuring less than 6mm and the largest just over 16mm. This larger lesion was sampled for chemistry and cytology, however, no concerning features were demonstrated. The multiplicity   and features of the cystic lesions suggest side branches intraductal muscinous neoplasms with studies for support. These will require surveillance, with the next study in the form of an MRI in one year. If the lesion has novel feature or rapid growth, repeat EUS will be recommended, however without these interval imaging in 2-3 years will be recommended.   Recommendation:      - Standard deep sedation recovery with probabldischarge home this morning - Follow up with Dr Barnes as scheduled - See impression above for surveillance recommendations - Complete a short course of antibiotic as prescribed - Avoid antithromobotics for at least three days - The findings and recommendations were discussed with the patient and their family     Patient states she is doing wonderful, just has a sore throat. Denies nausea, vomiting, fever and pain. Able to take in PO with no issues. She was very happy with the whole experience here and thought is was really great. She will follow-up with Dr. Barnes for her imaging in one year with him and they can let us know if there is anything that needs to be followed up with.     Orders placed: None at this time.     Contact information verified for future questions/concerns.    Hiral CARUSO RN Coordinator  Dr. Mccain, Dr. Overton & Dr. Raymond  [facility]  [phone]

## 2018-09-17 ENCOUNTER — CARE COORDINATION (OUTPATIENT)
Dept: GASTROENTEROLOGY | Facility: CLINIC | Age: 68
End: 2018-09-17

## 2018-09-17 DIAGNOSIS — K86.2 PANCREAS CYST: Primary | ICD-10-CM

## 2018-09-17 NOTE — PROGRESS NOTES
1 yr imaging follow-up: Order placed for MRCP.   Message left for her to call back to confirm message received and that there are no indications for her to have MRI- metal in the body, claustrophobia, etc.     Also left message for her to call to schedule at her convenience if se wished, radiology number given.     Hiral CARUSO RN Coordinator  Dr. Mccain, Dr. Overton & Dr. Raymond   [facility]  [phone]

## 2018-09-21 ENCOUNTER — TRANSFERRED RECORDS (OUTPATIENT)
Dept: HEALTH INFORMATION MANAGEMENT | Facility: CLINIC | Age: 68
End: 2018-09-21

## 2018-10-08 ENCOUNTER — DOCUMENTATION ONLY (OUTPATIENT)
Dept: GASTROENTEROLOGY | Facility: CLINIC | Age: 68
End: 2018-10-08

## 2018-10-08 NOTE — PROGRESS NOTES
Received MRCP final read from [facility] (9-) for 1 [place] [address].  Ph: [phone]    Film room called and images attached to chart and confirmed.    Needs to be reviewed by Dr. Overton.     KEITH Frey Dr[address] Dr. Overton, & Dr. Raymond  [facility]  [phone]

## 2018-10-08 NOTE — PROGRESS NOTES
Rachelle/Shira  Where is the scanned report?  This is critical.  Dc Overton MD PhD  Director of Endoscopy  Associate Professor of Medicine, Surgery and Pediatrics  Interventional and Therapeutic Endoscopy    [facility]  Division of Gastroenterology and Hepatology  [facility] [address]  [address], [place] [address]    New Consultations  [phone]  Procedure Scheduling [phone]  Clinical Nurse Coordinator [phone]  Clinical Fax   [phone]  Administrative   [phone]  Administrative Fax  [phone]

## 2018-10-11 ENCOUNTER — CARE COORDINATION (OUTPATIENT)
Dept: GASTROENTEROLOGY | Facility: CLINIC | Age: 68
End: 2018-10-11

## 2018-10-11 NOTE — PROGRESS NOTES
Message from Dr. Overton:  Wonderful   The recommendation would be to repeat MRI in one year   No invasive procedure at this juncture for relatively stable cyst without novel concerning feature   Dc     Called patient to let them know the above recommendations. Put a reminder to call her in about 9 months to organize MRI follow up.      KEITH Frey Dr., Dr. Overton, & Dr. Raymond  [facility]  [phone]

## 2018-10-25 ENCOUNTER — TELEPHONE (OUTPATIENT)
Dept: GASTROENTEROLOGY | Facility: CLINIC | Age: 68
End: 2018-10-25

## 2018-10-25 NOTE — TELEPHONE ENCOUNTER
Called and spoke with patient regarding the MRCP on 9/21/2018 (scanned into [facility]).     Per Dr. Overton:   No concerning findings  Slight increase in size   EUS in one year, recommended for surveillance.     Advised her of the above and his recommendation.   She verbalized understanding.   Gave her clinic number for any further questions/concerns.     KEITH Frey Dr., Dr. Overton, & Dr. Raymond  [facility]  [phone]

## 2019-03-15 ENCOUNTER — TRANSFERRED RECORDS (OUTPATIENT)
Dept: HEALTH INFORMATION MANAGEMENT | Facility: CLINIC | Age: 69
End: 2019-03-15

## 2019-04-09 ENCOUNTER — HOSPITAL ENCOUNTER (INPATIENT)
Dept: GENERAL RADIOLOGY | Facility: CLINIC | Age: 69
End: 2019-04-09
Attending: INTERNAL MEDICINE

## (undated) DEVICE — ENDO TUBING CO2 SMARTCAP STERILE DISP 100145CO2EXT

## (undated) DEVICE — SUCTION MANIFOLD DORNOCH ULTRA CART UL-CL500

## (undated) DEVICE — TAPE CLOTH 3" CARDINAL 3TRCL03

## (undated) DEVICE — ENDO PROBE COVER ULTRASOUND BALLOON LATEX  MAJ-249

## (undated) DEVICE — ENDO BITE BLOCK ADULT OMNI-BLOC

## (undated) DEVICE — ENDO NDL BALL TIP ULTRASOUND 19GA ECHO-19

## (undated) DEVICE — SOL WATER IRRIG 1000ML BOTTLE 2F7114

## (undated) DEVICE — KIT ENDO FIRST STEP DISINFECTANT 200ML W/POUCH EP-4

## (undated) DEVICE — PACK ENDOSCOPY GI CUSTOM UMMC

## (undated) RX ORDER — SIMETHICONE 40MG/0.6ML
SUSPENSION, DROPS(FINAL DOSAGE FORM)(ML) ORAL
Status: DISPENSED
Start: 2017-10-11

## (undated) RX ORDER — LIDOCAINE HYDROCHLORIDE 20 MG/ML
INJECTION, SOLUTION EPIDURAL; INFILTRATION; INTRACAUDAL; PERINEURAL
Status: DISPENSED
Start: 2017-10-11

## (undated) RX ORDER — FENTANYL CITRATE 50 UG/ML
INJECTION, SOLUTION INTRAMUSCULAR; INTRAVENOUS
Status: DISPENSED
Start: 2017-10-11

## (undated) RX ORDER — PROPOFOL 10 MG/ML
INJECTION, EMULSION INTRAVENOUS
Status: DISPENSED
Start: 2017-10-11